# Patient Record
Sex: MALE
[De-identification: names, ages, dates, MRNs, and addresses within clinical notes are randomized per-mention and may not be internally consistent; named-entity substitution may affect disease eponyms.]

---

## 2018-02-15 ENCOUNTER — HOSPITAL ENCOUNTER (EMERGENCY)
Dept: HOSPITAL 14 - H.ER | Age: 53
Discharge: HOME | End: 2018-02-15
Payer: COMMERCIAL

## 2018-02-15 VITALS
SYSTOLIC BLOOD PRESSURE: 134 MMHG | HEART RATE: 95 BPM | TEMPERATURE: 98.2 F | RESPIRATION RATE: 17 BRPM | OXYGEN SATURATION: 96 % | DIASTOLIC BLOOD PRESSURE: 89 MMHG

## 2018-02-15 DIAGNOSIS — J45.901: Primary | ICD-10-CM

## 2018-02-15 NOTE — ED PDOC
HPI: Asthma


Time Seen by Provider: 02/15/18 16:31


Chief Complaint (Nursing): Cough, Cold, Congestion


Chief Complaint (Provider): Shortness of breath


History Per: Patient


History/Exam Limitations: no limitations


Onset/Duration Of Symptoms: Days (x8)


Current Symptoms Are (Timing): Still Present


Additional Complaint(s): 


52 year old male with a past medical history of asthma presents to the ER 

complaining of shortness of breath, onset last Wednesday 2/7/18. Has been using 

inhaler at home without relief. Patient denies any fever, abdominal pain, nausea

, vomiting, diarrhea, chest pain, palpitations, ear pain, or throat pain. 





PMD: None  





Past Medical History


Reviewed: Historical Data, Nursing Documentation, Vital Signs


Vital Signs: 


 Last Vital Signs











Temp  98.6 F   02/15/18 16:21


 


Pulse  101 H  02/15/18 16:21


 


Resp  20   02/15/18 16:21


 


BP  159/98 H  02/15/18 16:21


 


Pulse Ox  100   02/15/18 16:21














- Medical History


PMH: Asthma





- Family History


Family History: States: Unknown Family Hx





- Social History


Current smoker - smoking cessation education provided: No


Alcohol: Occasional


Drugs: Denies





- Home Medications


Home Medications: 


 Ambulatory Orders











 Medication  Instructions  Recorded


 


Albuterol HFA [Ventolin HFA 90 1 puff IH Q3 #1 inhaler 02/15/18





mcg/actuation (8 g)]  


 


Promethazine DM [Phenergan DM 5 ml PO Q6 #150 ml 02/15/18





Syrup]  


 


predniSONE [predniSONE Tab] 2 tab PO DAILY #10 tab 02/15/18














- Allergies


Allergies/Adverse Reactions: 


 Allergies











Allergy/AdvReac Type Severity Reaction Status Date / Time


 


No Known Allergies Allergy   Verified 02/15/18 16:29














Review of Systems


ROS Statement: Except As Marked, All Systems Reviewed And Found Negative


Constitutional: Negative for: Fever, Chills


ENT: Negative for: Ear Pain, Throat Pain


Cardiovascular: Negative for: Chest Pain, Palpitations


Respiratory: Positive for: Shortness of Breath, Wheezing


Gastrointestinal: Negative for: Nausea, Vomiting, Abdominal Pain, Diarrhea





Physical Exam





- Reviewed


Nursing Documentation Reviewed: Yes


Vital Signs Reviewed: Yes





- Physical Exam


Appears: Positive for: Non-toxic, No Acute Distress


Head Exam: Positive for: ATRAUMATIC, NORMAL INSPECTION, NORMOCEPHALIC


Skin: Positive for: Normal Color, Warm, Dry


Eye Exam: Positive for: EOMI, Normal appearance, PERRL


ENT: Positive for: Normal ENT Inspection, Pharynx Is (clear).  Negative for: 

Pharyngeal Erythema, Tonsillar Exudate, Tonsillar Swelling


Neck: Positive for: Normal, Painless ROM, Supple


Cardiovascular/Chest: Positive for: Regular Rate, Rhythm.  Negative for: Murmur


Respiratory: Positive for: Wheezing (diffuse expiratory wheezing noted), Other (

Respirations even and non-labored, no nasal flaring or retractions. Speaking in 

full sentences).  Negative for: Respiratory Distress


Gastrointestinal/Abdominal: Positive for: Normal Exam, Soft.  Negative for: 

Tenderness


Neurologic/Psych: Positive for: Alert, Oriented (x3)





- ECG


O2 Sat by Pulse Oximetry: 100 (RA)


Pulse Ox Interpretation: Normal





Medical Decision Making


Medical Decision Making: 





Clinical Impression: Acute on chronic asthma  





Time: 17:19


Plan:


--Albuterol treatment


--Duoneb treatment x2


--Prednisone 60 mg PO


--Peak Flow pre/post treatment





19:05


On reevaluation, patient is medically stable and reports improvement. Lungs 

clear to auscultation. Will d/c with Ventolin inhaler, prednisone, and 

promethazine. Counseling was provided and all questions were answered regarding 

diagnosis and need for follow up with the clinic. There is agreement to 

discharge plan. Return if symptoms persist or worsen.





--------------------------------------------------------------------------------

-----------------


Scribe Attestation:   


Documented by Jody Currie, acting as a scribe for Jigna Batista PA-C





Provider Scribe Attestation:


All medical record entries made by the Scribe were at my direction and 

personally dictated by me. I have reviewed the chart and agree that the record 

accurately reflects my personal performance of the history, physical exam, 

medical decision making, and the department course for this patient. I have 

also personally directed, reviewed, and agree with the discharge instructions 

and disposition. 





Disposition





- Clinical Impression


Clinical Impression: 


 Asthma exacerbation, SOB (shortness of breath)








- Patient ED Disposition


Is Patient to be Admitted: No


Counseled Patient/Family Regarding: Diagnosis, Need For Followup, Rx Given





- Disposition


Referrals: 


[facility] [Outside]


Disposition: Routine/Home


Disposition Time: 19:05


Condition: STABLE


Prescriptions: 


Albuterol HFA [Ventolin HFA 90 mcg/actuation (8 g)] 1 puff IH Q3 #1 inhaler


predniSONE [predniSONE Tab] 2 tab PO DAILY #10 tab


Promethazine DM [Phenergan DM Syrup] 5 ml PO Q6 #150 ml


Instructions:  Asthma, Adult (DC), Shortness of Breath (Dyspnea) (DC), Avoiding 

Asthma Triggers


Forms:  [facility] (English)


Print Language: ENGLISH





- POA


Present On Arrival: None

## 2018-12-21 ENCOUNTER — HOSPITAL ENCOUNTER (EMERGENCY)
Dept: HOSPITAL 14 - H.ER | Age: 53
Discharge: LEFT BEFORE DISCHARGE | End: 2018-12-21
Payer: SELF-PAY

## 2018-12-21 VITALS — OXYGEN SATURATION: 96 %

## 2018-12-21 VITALS
DIASTOLIC BLOOD PRESSURE: 70 MMHG | SYSTOLIC BLOOD PRESSURE: 143 MMHG | TEMPERATURE: 98.7 F | HEART RATE: 129 BPM | RESPIRATION RATE: 18 BRPM

## 2018-12-21 VITALS — BODY MASS INDEX: 41.1 KG/M2

## 2018-12-21 DIAGNOSIS — J44.9: Primary | ICD-10-CM

## 2018-12-21 DIAGNOSIS — Z79.899: ICD-10-CM

## 2018-12-21 NOTE — RAD
HISTORY:

 SOB, cough 



COMPARISON:

Chest x-ray performed 1/25/13 



TECHNIQUE:

Chest PA and lateral



FINDINGS:





LUNGS:

Medial right lower lobe atelectasis or pneumonia. 



Please note that chest x-ray has limited sensitivity for the 

detection of pulmonary masses.



PLEURA:

No significant pleural effusion identified. No definite pneumothorax .



CARDIOVASCULAR:

Heart size appears within normal limits. 



OSSEOUS STRUCTURES:

Degenerative changes of the spine.



VISUALIZED UPPER ABDOMEN:

Elevation of the right hemidiaphragm.



OTHER FINDINGS:

None.



IMPRESSION:

Medial right lower lobe atelectasis or pneumonia.  Correlate 

clinically. 



Study marked for PA review.

## 2018-12-21 NOTE — ED PDOC
HPI: SOB/CHF/COPD





<Lucero Avila - Last Filed: 12/21/18 18:03>


Chief Complaint (Provider): Shortness of breath 


History Per: Patient


History/Exam Limitations: no limitations


Onset/Duration Of Symptoms: Days (1x)


Current Symptoms Are (Timing): Still Present


Additional Complaint(s): 


53 year old male presents to the ED for an evaluation of shortness of breath 

onset yesterday. She did not take Albuterol at home. Also reports of cough with 

white sputum. Denies fever or any other symptoms. 


PMD: Non H Provider








<Joana Hillman - Last Filed: 12/22/18 18:47>


Time Seen by Provider: 12/21/18 10:16


Chief Complaint (Nursing): Respiratory Distress





Past Medical History


Vital Signs: 





                                Last Vital Signs











Temp  98.7 F   12/21/18 13:40


 


Pulse  129 H  12/21/18 13:40


 


Resp  18   12/21/18 13:40


 


BP  143/70   12/21/18 13:40


 


Pulse Ox  96   12/21/18 13:43














<Lucero Avila - Last Filed: 12/21/18 18:03>


Reviewed: Historical Data, Nursing Documentation, Vital Signs


Vital Signs: 





                                Last Vital Signs











Temp  99.3 F   12/21/18 10:12


 


Pulse  109 H  12/21/18 10:12


 


Resp  16   12/21/18 10:41


 


BP  143/87   12/21/18 10:12


 


Pulse Ox  96   12/21/18 10:41














- Medical History


PMH: Asthma, Bronchitis


   Denies: Chronic Kidney Disease





- Surgical History


Surgical History: No Surg Hx





- Family History


Family History: States: Unknown Family Hx





- Social History


Current smoker - smoking cessation education provided: No


Alcohol: Social


Drugs: Denies





<Joana Hillman - Last Filed: 12/22/18 18:47>





- Home Medications


Home Medications: 


                                Ambulatory Orders











 Medication  Instructions  Recorded


 


Albuterol HFA [Ventolin HFA 90 1 puff IH Q3 #1 inhaler 02/15/18





mcg/actuation (8 g)]  


 


Promethazine DM [Phenergan DM 5 ml PO Q6 #150 ml 02/15/18





Syrup]  


 


predniSONE [predniSONE Tab] 2 tab PO DAILY #10 tab 02/15/18


 


Albuterol HFA [Ventolin HFA 90 2 puff IH [id number] PRN #1 bottle 12/21/18





mcg/actuation (8 g)]  


 


Azithromycin [Zithromax] 250 mg PO DAILY #6 tab 12/21/18


 


Prednisone 50 mg PO DAILY #4 tab 12/21/18














- Allergies


Allergies/Adverse Reactions: 


                                    Allergies











Allergy/AdvReac Type Severity Reaction Status Date / Time


 


No Known Allergies Allergy   Verified 02/15/18 16:29














Review of Systems


ROS Statement: Except As Marked, All Systems Reviewed And Found Negative


Constitutional: Negative for: Fever


Respiratory: Positive for: Cough, Shortness of Breath, Sputum (white)


Gastrointestinal: Negative for: Abdominal Pain





<KadyJoana - Last Filed: 12/22/18 18:47>





Physical Exam





- Reviewed


Nursing Documentation Reviewed: Yes


Vital Signs Reviewed: Yes





- Physical Exam


Appears: Positive for: Well, Non-toxic, No Acute Distress


Head Exam: Positive for: ATRAUMATIC, NORMAL INSPECTION, NORMOCEPHALIC


Skin: Positive for: Normal Color, Warm, Dry.  Negative for: Rash


Eye Exam: Positive for: EOMI, Normal appearance, PERRL


ENT: Positive for: Normal ENT Inspection


Neck: Positive for: Normal, Painless ROM, Supple.  Negative for: Decreased ROM


Cardiovascular/Chest: Positive for: Regular Rate, Rhythm.  Negative for: Murmur


Respiratory: Positive for: Normal Breath Sounds.  Negative for: Decreased Breath

Sounds, Wheezing, Respiratory Distress


Gastrointestinal/Abdominal: Positive for: Normal Exam, Soft.  Negative for: 

Tenderness


Back: Positive for: Normal Inspection


Extremity: Positive for: Normal ROM.  Negative for: Tenderness, Pedal Edema, 

Deformity


Neurologic/Psych: Positive for: Alert, Oriented (x3), Other (speaking full 

sentences).  Negative for: Motor/Sensory Deficits





<KadyJoana F - Last Filed: 12/22/18 18:47>





- ECG


O2 Sat by Pulse Oximetry: 96 (RA)


Pulse Ox Interpretation: Normal





<KadyJavona JACKIE - Last Filed: 12/22/18 18:47>





Medical Decision Making


Medical Decision Making: 


Time: 1032


Initial Plan:


Chest Two Views [rad]


Albuterol 3ml


Prednisone 50mg


Peak Flow Pre/Post TX 


Reevaluation 


 

--------------------------------------------------------------------------------


-----------------


Scribe Attestation:   


Documented by Maral Webb, acting as a scribe for Joana Hillman MD


Provider Scribe Attestation:


All medical record entries made by the Scribe were at my direction and 

personally dictated by me. I have reviewed the chart and agree that the record 

accurately reflects my personal performance of the history, physical exam, 

medical decision making, and the department course for this patient. I have also

 personally directed, reviewed, and agree with the discharge instructions and 

disposition.











<Joana Hillman - Last Filed: 12/22/18 18:47>





Disposition





<Lucero Avila - Last Filed: 12/21/18 18:03>





- Disposition


Disposition: Against Medical Advice


Disposition Time: 13:30





<Joana Hillman - Last Filed: 12/22/18 18:47>





- Clinical Impression


Clinical Impression: 


 Asthma








- Disposition


Condition: UNKNOWN


Additional Instructions: 


FOLLOW-UP WITH PMD WITHIN 2 DAYS FOR REEVALUATION. 


Prescriptions: 


Albuterol HFA [Ventolin HFA 90 mcg/actuation (8 g)] 2 puff IH [id number] PRN #1 

bottle


 PRN Reason: Shortness Of Breath


Azithromycin [Zithromax] 250 mg PO DAILY #6 tab


Prednisone 50 mg PO DAILY #4 tab


Forms:  [facility] (English)